# Patient Record
(demographics unavailable — no encounter records)

---

## 2025-04-24 NOTE — HISTORY OF PRESENT ILLNESS
[FreeTextEntry1] : WWV wt loss --nutrition and exercise--wts 3 x/week [Mammogramdate] : emr [BreastSonogramDate] : emr [PapSmeardate] : emr [ColonoscopyDate] : emr [Currently Active] : currently active [Men] : men

## 2025-04-24 NOTE — PHYSICAL EXAM
[MA] : MA [FreeTextEntry2] : mark barone [Appropriately responsive] : appropriately responsive [Alert] : alert [No Acute Distress] : no acute distress [No Lymphadenopathy] : no lymphadenopathy [Soft] : soft [Non-tender] : non-tender [Non-distended] : non-distended [No HSM] : No HSM [No Lesions] : no lesions [No Mass] : no mass [Oriented x3] : oriented x3 [Examination Of The Breasts] : a normal appearance [No Masses] : no breast masses were palpable [Labia Majora] : normal [Labia Minora] : normal [Atrophy] : atrophy [Normal] : normal [Uterine Adnexae] : normal